# Patient Record
Sex: FEMALE | Race: WHITE | NOT HISPANIC OR LATINO
[De-identification: names, ages, dates, MRNs, and addresses within clinical notes are randomized per-mention and may not be internally consistent; named-entity substitution may affect disease eponyms.]

---

## 2024-04-03 ENCOUNTER — NON-APPOINTMENT (OUTPATIENT)
Age: 34
End: 2024-04-03

## 2024-04-03 ENCOUNTER — APPOINTMENT (OUTPATIENT)
Dept: OBGYN | Facility: CLINIC | Age: 34
End: 2024-04-03
Payer: COMMERCIAL

## 2024-04-03 VITALS — WEIGHT: 174 LBS | BODY MASS INDEX: 28.99 KG/M2 | HEIGHT: 65 IN

## 2024-04-03 DIAGNOSIS — Z78.9 OTHER SPECIFIED HEALTH STATUS: ICD-10-CM

## 2024-04-03 DIAGNOSIS — Z71.9 COUNSELING, UNSPECIFIED: ICD-10-CM

## 2024-04-03 DIAGNOSIS — Z86.2 PERSONAL HISTORY OF DISEASES OF THE BLOOD AND BLOOD-FORMING ORGANS AND CERTAIN DISORDERS INVOLVING THE IMMUNE MECHANISM: ICD-10-CM

## 2024-04-03 DIAGNOSIS — Z01.419 ENCOUNTER FOR GYNECOLOGICAL EXAMINATION (GENERAL) (ROUTINE) W/OUT ABNORMAL FINDINGS: ICD-10-CM

## 2024-04-03 DIAGNOSIS — Z11.3 ENCOUNTER FOR SCREENING FOR INFECTIONS WITH A PREDOMINANTLY SEXUAL MODE OF TRANSMISSION: ICD-10-CM

## 2024-04-03 PROCEDURE — 99204 OFFICE O/P NEW MOD 45 MIN: CPT

## 2024-04-03 RX ORDER — B-COMPLEX WITH VITAMIN C
TABLET ORAL
Refills: 0 | Status: ACTIVE | COMMUNITY

## 2024-04-03 RX ORDER — ELAGOLIX 150 MG/1
150 TABLET, FILM COATED ORAL
Qty: 30 | Refills: 11 | Status: ACTIVE | COMMUNITY
Start: 2024-04-03 | End: 1900-01-01

## 2024-04-03 RX ORDER — OMEGA-3/DHA/EPA/FISH OIL 500-1000MG
500 CAPSULE ORAL
Refills: 0 | Status: ACTIVE | COMMUNITY

## 2024-04-03 RX ORDER — DROSPIRENONE AND ETHINYL ESTRADIOL 0.03MG-3MG
KIT ORAL
Refills: 0 | Status: ACTIVE | COMMUNITY

## 2024-04-03 RX ORDER — NAPROXEN SODIUM 500 MG/1
500 TABLET, FILM COATED, EXTENDED RELEASE ORAL DAILY
Qty: 30 | Refills: 2 | Status: ACTIVE | COMMUNITY
Start: 2024-04-03 | End: 1900-01-01

## 2024-04-04 ENCOUNTER — TRANSCRIPTION ENCOUNTER (OUTPATIENT)
Age: 34
End: 2024-04-04

## 2024-04-04 ENCOUNTER — APPOINTMENT (OUTPATIENT)
Dept: OBGYN | Facility: CLINIC | Age: 34
End: 2024-04-04
Payer: COMMERCIAL

## 2024-04-04 ENCOUNTER — ASOB RESULT (OUTPATIENT)
Age: 34
End: 2024-04-04

## 2024-04-04 PROCEDURE — 76830 TRANSVAGINAL US NON-OB: CPT

## 2024-04-04 NOTE — PLAN
[FreeTextEntry1] : Rec stopping OCP and start Orilissa for now to try if it helps more. Also discussed laparoscopy, consult with specialist in chronic pelvic pain- gave info for [facility]. Also discussed Lupron, TCA antidepressants for neuropathic pain. Also will get sono for now. Switch to naproxen for longer half life b/c she has chronic gastritis from NSAID use in the past. Answered all questions and reviewed history.

## 2024-04-04 NOTE — HISTORY OF PRESENT ILLNESS
[TextBox_4] : 34yo P0 here for consult/ new gyn. H/o pelvic pain, heavy bleeding, possible endometriosis. Had been controlled on OCP from - but stopped b/c rx  and wanted to observe periods without. Subsequently had heavy and long periods- evaluation initially revealed endometriomas on sono which later resolved and was told she probably had endometriosis.  No surgery done. She was restarted on Tamika but bleeding was not controlled then was finally given Mirena IUD to control bleeding 2022.  She started getting better after that- takes continous OCP and has Mirena currently but sometimes still gets pain like period is coming.  She gets sharp and tearing pain and also an intense pressure.  She also takes motrin prior to the placebo twice a day for 3 days. Has also been gaining weight since - she started Ozempic and was better but now cannot afford; insurance is declining.  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  She rows crew (also rowed in college.)

## 2024-04-09 RX ORDER — NAPROXEN 500 MG/1
500 TABLET ORAL
Qty: 20 | Refills: 3 | Status: ACTIVE | COMMUNITY
Start: 2024-04-09 | End: 1900-01-01

## 2024-04-15 ENCOUNTER — APPOINTMENT (OUTPATIENT)
Dept: OBGYN | Facility: CLINIC | Age: 34
End: 2024-04-15
Payer: COMMERCIAL

## 2024-04-15 DIAGNOSIS — R10.2 PELVIC AND PERINEAL PAIN: ICD-10-CM

## 2024-04-15 DIAGNOSIS — N80.9 ENDOMETRIOSIS, UNSPECIFIED: ICD-10-CM

## 2024-04-15 DIAGNOSIS — G89.29 PELVIC AND PERINEAL PAIN: ICD-10-CM

## 2024-04-15 PROCEDURE — 99213 OFFICE O/P EST LOW 20 MIN: CPT

## 2024-04-15 NOTE — HISTORY OF PRESENT ILLNESS
[TextBox_4] : 34yo P0 for telehealth to discuss US results done recently. She has long history of pelvic pain- see previous note.  Recently had hip pain and is having ortho evaluation/ found to have pulled tendon. She has tried Naproxen for pain with periods and is trying to start orilissa but for now has Mirena and takes OCP.  Needs pre-auth for the orilissa.

## 2024-04-15 NOTE — PLAN
[FreeTextEntry1] : Discussed normal USG findings.  She has IUD in good location and now large cysts or masses.  Discussed Orilissa and will have nursing call for auth with her insurance.  F/u after she has started the medication to see if she is improving with symptoms.

## 2024-05-16 ENCOUNTER — APPOINTMENT (OUTPATIENT)
Dept: BARIATRICS/WEIGHT MGMT | Facility: CLINIC | Age: 34
End: 2024-05-16

## 2024-07-18 DIAGNOSIS — Z71.3 DIETARY COUNSELING AND SURVEILLANCE: ICD-10-CM

## 2024-09-12 ENCOUNTER — APPOINTMENT (OUTPATIENT)
Dept: BARIATRICS/WEIGHT MGMT | Facility: CLINIC | Age: 34
End: 2024-09-12
Payer: COMMERCIAL

## 2024-09-12 VITALS — BODY MASS INDEX: 28.64 KG/M2 | HEIGHT: 66 IN | WEIGHT: 178.2 LBS

## 2024-09-12 DIAGNOSIS — Z71.3 DIETARY COUNSELING AND SURVEILLANCE: ICD-10-CM

## 2024-09-12 PROCEDURE — 97802 MEDICAL NUTRITION INDIV IN: CPT

## 2024-10-10 ENCOUNTER — APPOINTMENT (OUTPATIENT)
Dept: BARIATRICS/WEIGHT MGMT | Facility: CLINIC | Age: 34
End: 2024-10-10
Payer: COMMERCIAL

## 2024-10-10 VITALS — WEIGHT: 176.8 LBS | HEIGHT: 66 IN | BODY MASS INDEX: 28.42 KG/M2

## 2024-10-10 DIAGNOSIS — Z71.3 DIETARY COUNSELING AND SURVEILLANCE: ICD-10-CM

## 2024-10-10 PROCEDURE — 97803 MED NUTRITION INDIV SUBSEQ: CPT

## 2024-10-25 ENCOUNTER — APPOINTMENT (OUTPATIENT)
Dept: OBGYN | Facility: CLINIC | Age: 34
End: 2024-10-25

## 2024-11-14 ENCOUNTER — APPOINTMENT (OUTPATIENT)
Dept: BARIATRICS/WEIGHT MGMT | Facility: CLINIC | Age: 34
End: 2024-11-14
Payer: COMMERCIAL

## 2024-11-14 VITALS — WEIGHT: 168.2 LBS | HEIGHT: 66 IN | BODY MASS INDEX: 27.03 KG/M2

## 2024-11-14 DIAGNOSIS — Z71.3 DIETARY COUNSELING AND SURVEILLANCE: ICD-10-CM

## 2024-11-14 PROCEDURE — 97803 MED NUTRITION INDIV SUBSEQ: CPT

## 2024-12-16 ENCOUNTER — APPOINTMENT (OUTPATIENT)
Dept: BARIATRICS/WEIGHT MGMT | Facility: CLINIC | Age: 34
End: 2024-12-16
Payer: COMMERCIAL

## 2024-12-16 VITALS — HEIGHT: 66 IN | BODY MASS INDEX: 27.42 KG/M2 | WEIGHT: 170.6 LBS

## 2024-12-16 DIAGNOSIS — Z71.3 DIETARY COUNSELING AND SURVEILLANCE: ICD-10-CM

## 2024-12-16 PROCEDURE — 97803 MED NUTRITION INDIV SUBSEQ: CPT

## 2025-01-27 ENCOUNTER — APPOINTMENT (OUTPATIENT)
Dept: BARIATRICS/WEIGHT MGMT | Facility: CLINIC | Age: 35
End: 2025-01-27

## 2025-01-27 DIAGNOSIS — Z71.3 DIETARY COUNSELING AND SURVEILLANCE: ICD-10-CM

## 2025-05-12 ENCOUNTER — APPOINTMENT (OUTPATIENT)
Dept: OBGYN | Facility: CLINIC | Age: 35
End: 2025-05-12

## 2025-07-28 ENCOUNTER — APPOINTMENT (OUTPATIENT)
Dept: OBGYN | Facility: CLINIC | Age: 35
End: 2025-07-28

## 2025-08-01 ENCOUNTER — NON-APPOINTMENT (OUTPATIENT)
Age: 35
End: 2025-08-01

## 2025-08-01 ENCOUNTER — APPOINTMENT (OUTPATIENT)
Dept: OBGYN | Facility: CLINIC | Age: 35
End: 2025-08-01
Payer: COMMERCIAL

## 2025-08-01 VITALS
WEIGHT: 170 LBS | BODY MASS INDEX: 27.32 KG/M2 | DIASTOLIC BLOOD PRESSURE: 82 MMHG | SYSTOLIC BLOOD PRESSURE: 120 MMHG | HEIGHT: 66 IN

## 2025-08-01 DIAGNOSIS — E28.2 POLYCYSTIC OVARIAN SYNDROME: ICD-10-CM

## 2025-08-01 DIAGNOSIS — Z80.8 FAMILY HISTORY OF MALIGNANT NEOPLASM OF OTHER ORGANS OR SYSTEMS: ICD-10-CM

## 2025-08-01 DIAGNOSIS — Z11.3 ENCOUNTER FOR SCREENING FOR INFECTIONS WITH A PREDOMINANTLY SEXUAL MODE OF TRANSMISSION: ICD-10-CM

## 2025-08-01 DIAGNOSIS — Z01.419 ENCOUNTER FOR GYNECOLOGICAL EXAMINATION (GENERAL) (ROUTINE) W/OUT ABNORMAL FINDINGS: ICD-10-CM

## 2025-08-01 PROCEDURE — 99395 PREV VISIT EST AGE 18-39: CPT

## 2025-08-01 PROCEDURE — 99459 PELVIC EXAMINATION: CPT

## 2025-08-01 RX ORDER — SPIRONOLACTONE 50 MG/1
TABLET ORAL
Refills: 0 | Status: ACTIVE | COMMUNITY

## 2025-08-04 ENCOUNTER — TRANSCRIPTION ENCOUNTER (OUTPATIENT)
Age: 35
End: 2025-08-04

## 2025-08-04 LAB
C TRACH RRNA SPEC QL NAA+PROBE: NOT DETECTED
HBV SURFACE AG SER QL: NONREACTIVE
HCV AB SER QL: NONREACTIVE
HCV S/CO RATIO: 0.12 S/CO
HIV1+2 AB SPEC QL IA.RAPID: NONREACTIVE
HPV HIGH+LOW RISK DNA PNL CVX: NOT DETECTED
N GONORRHOEA RRNA SPEC QL NAA+PROBE: NOT DETECTED
SOURCE TP AMPLIFICATION: NORMAL
T PALLIDUM AB SER QL IA: NEGATIVE

## 2025-08-05 LAB — CYTOLOGY CVX/VAG DOC THIN PREP: NORMAL

## 2025-08-22 ENCOUNTER — TRANSCRIPTION ENCOUNTER (OUTPATIENT)
Age: 35
End: 2025-08-22